# Patient Record
Sex: FEMALE | Race: OTHER | Employment: FULL TIME | URBAN - METROPOLITAN AREA
[De-identification: names, ages, dates, MRNs, and addresses within clinical notes are randomized per-mention and may not be internally consistent; named-entity substitution may affect disease eponyms.]

---

## 2020-07-28 ENCOUNTER — APPOINTMENT (OUTPATIENT)
Dept: GENERAL RADIOLOGY | Age: 35
End: 2020-07-28
Attending: EMERGENCY MEDICINE
Payer: COMMERCIAL

## 2020-07-28 ENCOUNTER — HOSPITAL ENCOUNTER (OUTPATIENT)
Age: 35
Discharge: HOME | End: 2020-07-28
Attending: EMERGENCY MEDICINE
Payer: COMMERCIAL

## 2020-07-28 VITALS
RESPIRATION RATE: 16 BRPM | DIASTOLIC BLOOD PRESSURE: 57 MMHG | TEMPERATURE: 98 F | OXYGEN SATURATION: 98 % | SYSTOLIC BLOOD PRESSURE: 110 MMHG | HEART RATE: 68 BPM

## 2020-07-28 DIAGNOSIS — M77.50 TENDONITIS OF FOOT: Primary | ICD-10-CM

## 2020-07-28 PROCEDURE — 99204 OFFICE O/P NEW MOD 45 MIN: CPT

## 2020-07-28 PROCEDURE — 73630 X-RAY EXAM OF FOOT: CPT | Performed by: EMERGENCY MEDICINE

## 2020-07-28 PROCEDURE — 96372 THER/PROPH/DIAG INJ SC/IM: CPT

## 2020-07-28 RX ORDER — DICLOFENAC SODIUM 75 MG/1
75 TABLET, DELAYED RELEASE ORAL 2 TIMES DAILY
Qty: 20 TABLET | Refills: 0 | Status: SHIPPED | OUTPATIENT
Start: 2020-07-28

## 2020-07-28 RX ORDER — KETOROLAC TROMETHAMINE 30 MG/ML
60 INJECTION, SOLUTION INTRAMUSCULAR; INTRAVENOUS ONCE
Status: COMPLETED | OUTPATIENT
Start: 2020-07-28 | End: 2020-07-28

## 2020-07-28 NOTE — ED PROVIDER NOTES
Patient Seen in: [facility]      History   Patient presents with: Foot Pain    Stated Complaint: lump,pain/right ankle & foot,no injury    HPI    Patient is a 70-year-old woman here with right foot pain.   She works as a CNA ove Musculoskeletal: Normal range of motion and neck supple. Cardiovascular:      Rate and Rhythm: Normal rate. Pulmonary:      Effort: Pulmonary effort is normal. No respiratory distress. Abdominal:      General: There is no distension.       Palpations: consistent with gout. Probably FHL tendinitis. Patient has no history of trauma. No cellulitic findings. There is no open wounds or anything to suggest abscess. She is given Toradol.   Recommended NSAIDs ice elevation and orthopedics follow-up return i

## 2020-07-28 NOTE — ED INITIAL ASSESSMENT (HPI)
Pain and swelling to right medial arch of foot this morning. 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 No known injury.

## (undated) NOTE — LETTER
Date & Time: 7/28/2020, 2:31 PM  Patient: Solange Abdi  Encounter Provider(s):    Lenka Martinez MD       To Whom It May Concern:    Solange Abdi was seen and treated in our department on 7/28/2020.  She should not return to work [employer]